# Patient Record
Sex: MALE | Race: WHITE | NOT HISPANIC OR LATINO | Employment: FULL TIME | URBAN - METROPOLITAN AREA
[De-identification: names, ages, dates, MRNs, and addresses within clinical notes are randomized per-mention and may not be internally consistent; named-entity substitution may affect disease eponyms.]

---

## 2019-04-24 ENCOUNTER — TELEPHONE (OUTPATIENT)
Dept: HEPATOLOGY | Facility: CLINIC | Age: 44
End: 2019-04-24

## 2019-04-24 NOTE — TELEPHONE ENCOUNTER
Dr. Jordan Braun ordered that patient be scheduled for hep c consult.  Attempt made to reach him to schedule appt with PA Scheuermann.  I LVM and sent letter asking that he call us for scheduling.

## 2019-05-10 ENCOUNTER — LAB VISIT (OUTPATIENT)
Dept: LAB | Facility: HOSPITAL | Age: 44
End: 2019-05-10
Payer: MEDICAID

## 2019-05-10 ENCOUNTER — PROCEDURE VISIT (OUTPATIENT)
Dept: HEPATOLOGY | Facility: CLINIC | Age: 44
End: 2019-05-10
Attending: PHYSICIAN ASSISTANT
Payer: MEDICAID

## 2019-05-10 ENCOUNTER — INITIAL CONSULT (OUTPATIENT)
Dept: HEPATOLOGY | Facility: CLINIC | Age: 44
End: 2019-05-10
Payer: MEDICAID

## 2019-05-10 VITALS
RESPIRATION RATE: 18 BRPM | HEART RATE: 81 BPM | TEMPERATURE: 98 F | HEIGHT: 66 IN | BODY MASS INDEX: 23.66 KG/M2 | SYSTOLIC BLOOD PRESSURE: 119 MMHG | DIASTOLIC BLOOD PRESSURE: 75 MMHG | WEIGHT: 147.25 LBS

## 2019-05-10 DIAGNOSIS — R74.8 ABNORMAL TRANSAMINASES: ICD-10-CM

## 2019-05-10 DIAGNOSIS — B18.2 CHRONIC HEPATITIS C WITHOUT HEPATIC COMA: ICD-10-CM

## 2019-05-10 DIAGNOSIS — R53.83 FATIGUE, UNSPECIFIED TYPE: ICD-10-CM

## 2019-05-10 DIAGNOSIS — B18.2 CHRONIC HEPATITIS C WITHOUT HEPATIC COMA: Primary | ICD-10-CM

## 2019-05-10 LAB
ALBUMIN SERPL BCP-MCNC: 4.3 G/DL (ref 3.5–5.2)
ALP SERPL-CCNC: 95 U/L (ref 55–135)
ALT SERPL W/O P-5'-P-CCNC: 55 U/L (ref 10–44)
ANION GAP SERPL CALC-SCNC: 8 MMOL/L (ref 8–16)
AST SERPL-CCNC: 26 U/L (ref 10–40)
BASOPHILS # BLD AUTO: 0.02 K/UL (ref 0–0.2)
BASOPHILS NFR BLD: 0.4 % (ref 0–1.9)
BILIRUB SERPL-MCNC: 1.9 MG/DL (ref 0.1–1)
BUN SERPL-MCNC: 19 MG/DL (ref 6–20)
CALCIUM SERPL-MCNC: 9.7 MG/DL (ref 8.7–10.5)
CHLORIDE SERPL-SCNC: 103 MMOL/L (ref 95–110)
CO2 SERPL-SCNC: 30 MMOL/L (ref 23–29)
CREAT SERPL-MCNC: 0.9 MG/DL (ref 0.5–1.4)
DIFFERENTIAL METHOD: ABNORMAL
EOSINOPHIL # BLD AUTO: 0.1 K/UL (ref 0–0.5)
EOSINOPHIL NFR BLD: 1.1 % (ref 0–8)
ERYTHROCYTE [DISTWIDTH] IN BLOOD BY AUTOMATED COUNT: 11.8 % (ref 11.5–14.5)
EST. GFR  (AFRICAN AMERICAN): >60 ML/MIN/1.73 M^2
EST. GFR  (NON AFRICAN AMERICAN): >60 ML/MIN/1.73 M^2
FERRITIN SERPL-MCNC: 159 NG/ML (ref 20–300)
GLUCOSE SERPL-MCNC: 73 MG/DL (ref 70–110)
HBV CORE AB SERPL QL IA: NEGATIVE
HBV SURFACE AB SER-ACNC: POSITIVE M[IU]/ML
HBV SURFACE AG SERPL QL IA: NEGATIVE
HCT VFR BLD AUTO: 42.3 % (ref 40–54)
HEPATITIS A ANTIBODY, IGG: POSITIVE
HGB BLD-MCNC: 14.6 G/DL (ref 14–18)
HIV 1+2 AB+HIV1 P24 AG SERPL QL IA: NEGATIVE
IGG SERPL-MCNC: 1273 MG/DL (ref 650–1600)
IMM GRANULOCYTES # BLD AUTO: 0.02 K/UL (ref 0–0.04)
IMM GRANULOCYTES NFR BLD AUTO: 0.4 % (ref 0–0.5)
INR PPP: 1 (ref 0.8–1.2)
IRON SERPL-MCNC: 98 UG/DL (ref 45–160)
LYMPHOCYTES # BLD AUTO: 2 K/UL (ref 1–4.8)
LYMPHOCYTES NFR BLD: 35.8 % (ref 18–48)
MCH RBC QN AUTO: 31.1 PG (ref 27–31)
MCHC RBC AUTO-ENTMCNC: 34.5 G/DL (ref 32–36)
MCV RBC AUTO: 90 FL (ref 82–98)
MONOCYTES # BLD AUTO: 0.5 K/UL (ref 0.3–1)
MONOCYTES NFR BLD: 9.5 % (ref 4–15)
NEUTROPHILS # BLD AUTO: 2.9 K/UL (ref 1.8–7.7)
NEUTROPHILS NFR BLD: 52.8 % (ref 38–73)
NRBC BLD-RTO: 0 /100 WBC
PLATELET # BLD AUTO: 223 K/UL (ref 150–350)
PMV BLD AUTO: 9.4 FL (ref 9.2–12.9)
POTASSIUM SERPL-SCNC: 3.6 MMOL/L (ref 3.5–5.1)
PROT SERPL-MCNC: 8 G/DL (ref 6–8.4)
PROTHROMBIN TIME: 10.2 SEC (ref 9–12.5)
RBC # BLD AUTO: 4.69 M/UL (ref 4.6–6.2)
SATURATED IRON: 23 % (ref 20–50)
SODIUM SERPL-SCNC: 141 MMOL/L (ref 136–145)
TOTAL IRON BINDING CAPACITY: 422 UG/DL (ref 250–450)
TRANSFERRIN SERPL-MCNC: 285 MG/DL (ref 200–375)
TSH SERPL DL<=0.005 MIU/L-ACNC: 0.43 UIU/ML (ref 0.4–4)
WBC # BLD AUTO: 5.56 K/UL (ref 3.9–12.7)

## 2019-05-10 PROCEDURE — 99214 OFFICE O/P EST MOD 30 MIN: CPT | Mod: PBBFAC,25 | Performed by: PHYSICIAN ASSISTANT

## 2019-05-10 PROCEDURE — 83540 ASSAY OF IRON: CPT

## 2019-05-10 PROCEDURE — 86704 HEP B CORE ANTIBODY TOTAL: CPT

## 2019-05-10 PROCEDURE — 99999 PR PBB SHADOW E&M-EST. PATIENT-LVL IV: ICD-10-PCS | Mod: PBBFAC,,, | Performed by: PHYSICIAN ASSISTANT

## 2019-05-10 PROCEDURE — 99999 PR PBB SHADOW E&M-EST. PATIENT-LVL IV: CPT | Mod: PBBFAC,,, | Performed by: PHYSICIAN ASSISTANT

## 2019-05-10 PROCEDURE — 87340 HEPATITIS B SURFACE AG IA: CPT

## 2019-05-10 PROCEDURE — 87522 HEPATITIS C REVRS TRNSCRPJ: CPT

## 2019-05-10 PROCEDURE — 86038 ANTINUCLEAR ANTIBODIES: CPT

## 2019-05-10 PROCEDURE — 91200 LIVER ELASTOGRAPHY: CPT | Mod: 26,S$PBB,, | Performed by: PHYSICIAN ASSISTANT

## 2019-05-10 PROCEDURE — 99204 PR OFFICE/OUTPT VISIT, NEW, LEVL IV, 45-59 MIN: ICD-10-PCS | Mod: S$PBB,,, | Performed by: PHYSICIAN ASSISTANT

## 2019-05-10 PROCEDURE — 91200 LIVER ELASTOGRAPHY: CPT | Mod: PBBFAC | Performed by: PHYSICIAN ASSISTANT

## 2019-05-10 PROCEDURE — 86703 HIV-1/HIV-2 1 RESULT ANTBDY: CPT

## 2019-05-10 PROCEDURE — 82728 ASSAY OF FERRITIN: CPT

## 2019-05-10 PROCEDURE — 86790 VIRUS ANTIBODY NOS: CPT

## 2019-05-10 PROCEDURE — 84443 ASSAY THYROID STIM HORMONE: CPT

## 2019-05-10 PROCEDURE — 91200 PR LIVER ELASTOGRAPHY W/OUT IMAG W/INTERP & REPORT: ICD-10-PCS | Mod: 26,S$PBB,, | Performed by: PHYSICIAN ASSISTANT

## 2019-05-10 PROCEDURE — 82784 ASSAY IGA/IGD/IGG/IGM EACH: CPT

## 2019-05-10 PROCEDURE — 86235 NUCLEAR ANTIGEN ANTIBODY: CPT

## 2019-05-10 PROCEDURE — 36415 COLL VENOUS BLD VENIPUNCTURE: CPT

## 2019-05-10 PROCEDURE — 86256 FLUORESCENT ANTIBODY TITER: CPT | Mod: 91

## 2019-05-10 PROCEDURE — 80053 COMPREHEN METABOLIC PANEL: CPT

## 2019-05-10 PROCEDURE — 82595 ASSAY OF CRYOGLOBULIN: CPT

## 2019-05-10 PROCEDURE — 86706 HEP B SURFACE ANTIBODY: CPT

## 2019-05-10 PROCEDURE — 85610 PROTHROMBIN TIME: CPT

## 2019-05-10 PROCEDURE — 99204 OFFICE O/P NEW MOD 45 MIN: CPT | Mod: S$PBB,,, | Performed by: PHYSICIAN ASSISTANT

## 2019-05-10 PROCEDURE — 85025 COMPLETE CBC W/AUTO DIFF WBC: CPT

## 2019-05-10 RX ORDER — VELPATASVIR AND SOFOSBUVIR 100; 400 MG/1; MG/1
1 TABLET, FILM COATED ORAL DAILY
Qty: 28 TABLET | Refills: 2 | Status: SHIPPED | OUTPATIENT
Start: 2019-05-10 | End: 2020-01-03 | Stop reason: ALTCHOICE

## 2019-05-10 NOTE — PROCEDURES
Procedures   Fibroscan Procedure     Name: Chet Finley  Date of Procedure : 05/10/2019   :: Jennifer B Scheuermann, PA  Diagnosis: HCV    Probe: M    Fibroscan readin.2 KPa    Fibrosis:F 0-1     CAP readin dB/m    Steatosis: :<S1

## 2019-05-10 NOTE — LETTER
May 10, 2019      Jordan Braun MD  [address] [facility] [address]  [place] [address]           Deshawn Meza - Hepatitis C  [address]  [facility] [address]  Phone: [phone]  Fax: [phone]          Patient: Chet Finley   MR Number: [id number]   YOB: 1975   Date of Visit: 5/10/2019       Dear Dr. Jordan Braun:    Thank you for referring Chet Finley to me for evaluation. Attached you will find relevant portions of my assessment and plan of care.    If you have questions, please do not hesitate to call me. I look forward to following Chet Finley along with you.    Sincerely,    Jennifer B. Scheuermann, PA    Enclosure  CC:  No Recipients    If you would like to receive this communication electronically, please contact [email or web address] or [phone] to request more information on [employer] Link access.    For providers and/or their staff who would like to refer a patient to [facility], please contact us through our one-stop-shop provider referral line, [facility], at [phone].    If you feel you have received this communication in error or would no longer like to receive these types of communications, please e-mail [email or web address]

## 2019-05-10 NOTE — PROGRESS NOTES
HEPATOLOGY CLINIC VISIT NOTE - HCV clinic    OUTSIDE REFERRING PROVIDER: Joradn Braun MD    CHIEF COMPLAINT: Hepatitis C   (here w/ ex-wife, now girlfriend)    HISTORY: This is a 44 y.o. white male with chronic hepatitis C, here for further eval / mngmt.   Outside records have been received / reviewed.      HCV history:  Originally diagnosed w/ HCV ~ 1.5 yrs ago after being evaluated for icteric illness and feeling poorly    Seen earlier this year by by Salud Ignacio NP (GI office in [place])  Appears HCV rx was prescribed but denied due to lack of fibrosis.  Pt received email yesterday w/ attached [employer] application for patient assistance / free drug.    Risks for HCV: Prior IV and intranasal drug use: first use age 24, last use 2 yrs ago    Tattoos: first one at age 15    No blood transfusions  - Treatment naive  - Genotype 1a  - NS5A resistance not known  - HCV RNA 65,407 IU/mL (1/21/19 - scanned in media)      Liver staging:  HCV FibroTest - A3, F0 (1/21/19 - scanned in Media)  No liver imaging avail but describes u/s w/in last week at Dr Braun's  Labs reveal well preserved liver function w/ no evidence of advanced fibrosis  1/21/19 outside labs (scannd in media)   Elevated transamianses ALT>AST   Albumin > 4   Normal tbili   CBC unremarkable w/ plt > 200       Pt reports not feeling well. Describes noted fatigue / malaise, intermittent joint aching - affecting his ability to work.  Recent back pain. No urinary symptoms  Denies current jaundice, dark urine, abdominal distention, hematemesis, melena, slowed mentation.   No abnormal skin rashes.                     Past Medical History:   Diagnosis Date    Chronic hepatitis C      No past surgical history on file.      FAMILY HISTORY: Negative for liver disease    SOCIAL HISTORY:    but together with his ex-wife again (she is here with him)  Currently works cutting grass  Previously worked off [employer] for 10-12 yrs, 2wks on and 2 weeks off  Social  "History     Tobacco Use   Smoking Status Not on file     Alcohol - none now. Previously drank beer heavily during his "off time" for 10-12 yrs while working [place].  Drugs - prior IVDA, first use age 24, last use 2 yrs ago      ROS:   No fever, chills, weight loss, (+) fatigue  No chest pain, palpitations, dyspnea, cough  No abdominal pain, change in bowel pattern, nausea, vomiting, GERD  No dysuria, hematuria   No skin rashes   No headaches  No lower extremity edema  No depression or anxiety      PHYSICAL EXAM:  Friendly Unknown male, in no acute distress; alert and oriented to person, place and time  VITALS: reviewed  HEENT: Sclerae anicteric.   NECK: Supple  CVS: Regular rate and rhythm. No murmurs  LUNGS: Normal respiratory effort. Clear bilaterally  ABDOMEN: Flat, soft, nontender. No organomegaly or masses. No ascites or hernias. Good bowel sounds.    SKIN: Warm and dry. No jaundice, No obvious rashes. (+) tattoos  EXTREMITIES: No lower extremity edema  NEURO/PSYCH: Normal gate. Memory intact. Thought and speech pattern appropriate. Behavior normal. No depression or anxiety noted.      RECENT LABS:  See above - outside labs 1/2019  No records of HIV, HBV screening avail to me    RECENT IMAGING:  none    ASSESSMENT  44 y.o. Unknown male with:  1. CHRONIC HEPATITIS C, GENOTYPE 1a - treatment naive  -- FibroTest 1/2019 - A3, F0  -- Elevated transaminases  -- Unknown Immunity to HAV & HBV    2. HISTORY OF DRUG USE  -- clean x 2 years      DISCUSSION / EDUCATION:  *Discussed w/ pt that he does not need to see both Salud Ignacio NP and myself for HCV. Pt states he would like to continue HCV care here.    The natural history of Hepatitis C, including potential progression to cirrhosis was reviewed. We discussed the increased progression of liver disease secondary to alcohol use; patient was advised to avoid alcohol completely.     Transmission of Hepatitis C was reviewed, including possible sexual transmission. " Sexual contacts should be screened.   Patient should avoid sharing personal products such as razors, toothbrushes, etc.     Recommend avoiding raw seafood.  Limit acetaminophen to 2000mg daily.      HCV RX  Discussed goal of HCV eradication to prevent progression of liver disease.  Discussed use of Epclusa daily x 12 weeks w/ potential side effects of fatigue and headache.     Reviewed limitations on acid suppressant medications due to DDI w/ Epclusa:  -- Antacids, H2 Receptor Antagonist, PPI - Pt not taking  Patient instructed to contact me if experiencing acid related symptoms so further recommendations can be made regarding acid suppression therapy.      Herbal / alternative therapies must be discontinued  Pt will notify me if new medications are prescribed due to risk of drug interactions  Discussed importance of medication adherence and risk of treatment failure / viral resistance if not adherent. Pt has verbalized understanding.      PLAN:  1. Labs today  Orders Placed This Encounter   Procedures    CBC auto differential    Comprehensive metabolic panel    Protime-INR    HIV 1/2 Ag/Ab (4th Gen)    Hepatitis C RNA, quantitative, PCR    Hepatitis B surface antigen    Hepatitis B surface antibody    Hepatitis B core antibody, total    Hepatitis A antibody, IgG    Cryoglobulin    Ferritin    Iron and TIBC    SAMANTHA Screen w/Reflex    Antimitochondrial antibody    Anti-smooth muscle antibody    IgG    TSH     2. FibroScan today  3. Obtain outside u/s  4. Will move forward w/ Epclusa x 12 weeks in July  (Medicaid HCV DAA Prior Auth form was reviewed w/ pt.)  5. Remain off drugs/alcohol.     ADDENDUM:  FIBROSCAN today 5/10/10 - kPa 5.2, F0-1,   Outside u/s 5/2/19 from Dr Braun's office received - liver unremarkable. Spleen 12.6cm

## 2019-05-13 ENCOUNTER — TELEPHONE (OUTPATIENT)
Dept: PHARMACY | Facility: CLINIC | Age: 44
End: 2019-05-13

## 2019-05-13 LAB
ANA SER QL IF: NORMAL
HCV RNA SERPL NAA+PROBE-LOG IU: 4.04 LOG (10) IU/ML
HCV RNA SERPL QL NAA+PROBE: DETECTED IU/ML
HCV RNA SPEC NAA+PROBE-ACNC: ABNORMAL IU/ML
MITOCHONDRIA AB TITR SER IF: NORMAL {TITER}

## 2019-05-13 NOTE — TELEPHONE ENCOUNTER
LVM to notify the patient we received the prescription for Epclusa, and it will require authorization from the insurance company. We will continue to follow up.

## 2019-05-14 LAB — SMOOTH MUSCLE AB TITR SER IF: ABNORMAL {TITER}

## 2019-05-20 ENCOUNTER — PATIENT MESSAGE (OUTPATIENT)
Dept: HEPATOLOGY | Facility: CLINIC | Age: 44
End: 2019-05-20

## 2019-05-20 ENCOUNTER — TELEPHONE (OUTPATIENT)
Dept: HEPATOLOGY | Facility: CLINIC | Age: 44
End: 2019-05-20

## 2019-05-20 LAB — CRYOGLOB SER QL: ABNORMAL

## 2019-05-23 ENCOUNTER — PATIENT MESSAGE (OUTPATIENT)
Dept: HEPATOLOGY | Facility: CLINIC | Age: 44
End: 2019-05-23

## 2019-06-04 ENCOUNTER — PATIENT MESSAGE (OUTPATIENT)
Dept: HEPATOLOGY | Facility: CLINIC | Age: 44
End: 2019-06-04

## 2019-07-03 ENCOUNTER — PATIENT MESSAGE (OUTPATIENT)
Dept: HEPATOLOGY | Facility: CLINIC | Age: 44
End: 2019-07-03

## 2019-07-15 ENCOUNTER — TELEPHONE (OUTPATIENT)
Dept: PHARMACY | Facility: CLINIC | Age: 44
End: 2019-07-15

## 2019-07-15 ENCOUNTER — PATIENT MESSAGE (OUTPATIENT)
Dept: HEPATOLOGY | Facility: CLINIC | Age: 44
End: 2019-07-15

## 2019-07-15 NOTE — TELEPHONE ENCOUNTER
Epclusa Initial:  Initial Epclusa consult completed on 7/15. Epclusa will be shipped on  to arrive on  at patient's home via [employer]. $0.00 from -.  Patient plans to start Epclusa on - Confirmed 2 patient identifies - name and . Therapy appropriate.      Counseling was reviewed:      1. Patient MUST take Epclusa at the same time every day with or without food.       2. Patient MUST avoid acid reducers without consulting myself or provider first. Antacids are to be spaced out at least 4 hours apart from Epclusa.      3. Potential side effects include: headaches and fatigue.  Headache: Patient may treat with OTC remedies. If Tylenol is used, dose should not exceed 2000mg per day.      4.Medication list reviewed. No DDI's or allergies noted. Patient MUST contact myself or provider prior to starting any new OTC, herbal, or prescription drugs to avoid potential DDIs.         Discussed the importance of staying well hydrated while on therapy. Compliance stressed, patient to take missed doses as soon as remember, but NOT to take 2 doses in one day. Patient will report questions or concerns to myself or practitioner.  Patient verbalizes understanding. Patient plans to start Epclusa on .  Consultation included: indication, goals of treatment, administration, storage and handling, side effects, how to handle missed doses, the importance of laboratory monitoring, the importance of keeping all follow up appointment. Patient understands to report any medication changes to [facility] and provider All questions answered and addressed to patient's satisfaction, Follow-up will occur 7 days from start of treatment, [facility] to contact patient in 3 weeks for refills. Provider notified of start date.

## 2019-07-15 NOTE — TELEPHONE ENCOUNTER
DOCUMENTATION ONLY:  Prior authorization for AG Epclusa does not required prior authorization with insurance company.     Co-pay: $0    Patient Assistance IS NOT required.     Forward to clinical pharmacist for consult & shipment.

## 2019-07-19 ENCOUNTER — TELEPHONE (OUTPATIENT)
Dept: HEPATOLOGY | Facility: CLINIC | Age: 44
End: 2019-07-19

## 2019-07-19 DIAGNOSIS — B18.2 CHRONIC HEPATITIS C WITHOUT HEPATIC COMA: Primary | ICD-10-CM

## 2019-07-19 NOTE — TELEPHONE ENCOUNTER
Pt beginning 12 weeks of Epclusa on 7/17  Kaity 1a, tx naive  F0-1        Pls schedule:  - CMP, HCV RNA at week 6 - 8/27  - CMP, HCV RNA - SVR12 - 12/31

## 2019-08-01 ENCOUNTER — PATIENT MESSAGE (OUTPATIENT)
Dept: HEPATOLOGY | Facility: CLINIC | Age: 44
End: 2019-08-01

## 2019-08-01 NOTE — TELEPHONE ENCOUNTER
Spoke to wife:  Several days ago pt had n/v x 1  Now pt w/ significant fatigue, cough, pain in shoulders    Discussed that symptoms are not likely from liver disease, HCV, or epclusa  Recommended pt f/u w/ PCP    Wife verbalized understanding.

## 2019-08-08 ENCOUNTER — TELEPHONE (OUTPATIENT)
Dept: PHARMACY | Facility: CLINIC | Age: 44
End: 2019-08-08

## 2019-08-08 NOTE — TELEPHONE ENCOUNTER
Epclusa (2 of 3)  refill confirmed. We will ship Epclusa refill on  via [employer]ex to arrive on . $0.00 copay- 004. Confirmed 2 patient identifiers - name and .     Patient has 7 doses of Epclusa remaining and takes it around 9pm nightly. Patient had some issues with fatigue, cough, and pain; but symptoms were unrelated to liver disease or Epclusa. Patient doing much better now and has no compliants No missed doses, no new medications, no new allergies or health conditions reported at this time. All questions answered and addressed to patients satisfaction. Pt verbalized understanding.

## 2019-08-28 NOTE — TELEPHONE ENCOUNTER
Refill and Follow-up:   Patient requested a call back on 8/29 because he is not at home. Will follow-up.

## 2019-09-04 ENCOUNTER — TELEPHONE (OUTPATIENT)
Dept: PHARMACY | Facility: CLINIC | Age: 44
End: 2019-09-04

## 2019-09-04 NOTE — TELEPHONE ENCOUNTER
Epclusa refill 3 of 3 attempted. Patient was not at home at time of call. Was given another number to reach him at [phone]. No answer. Left voicemail for call back.

## 2019-09-06 NOTE — TELEPHONE ENCOUNTER
Epclusa refill (3 of 3) confirmed with patient's wife, Thalia. We will ship Epclusa refill on  via fedex to arrive on 9/10. $0 copay- 004. Confirmed 2 patient identifiers - name and .     She reports that patient has ~6 doses of Epclusa remaining and takes it around 9:00 pm daily.  No side effects, missed doses, new medications, new allergies or health conditions reported at this time. All questions answered and addressed to satisfaction.

## 2019-09-27 ENCOUNTER — LAB VISIT (OUTPATIENT)
Dept: LAB | Facility: HOSPITAL | Age: 44
End: 2019-09-27
Attending: PHYSICIAN ASSISTANT
Payer: MEDICAID

## 2019-09-27 DIAGNOSIS — B18.2 CHRONIC HEPATITIS C WITHOUT HEPATIC COMA: ICD-10-CM

## 2019-09-27 LAB
ALBUMIN SERPL BCP-MCNC: 4.3 G/DL (ref 3.5–5.2)
ALP SERPL-CCNC: 85 U/L (ref 55–135)
ALT SERPL W/O P-5'-P-CCNC: 19 U/L (ref 10–44)
ANION GAP SERPL CALC-SCNC: 10 MMOL/L (ref 8–16)
AST SERPL-CCNC: 20 U/L (ref 10–40)
BILIRUB SERPL-MCNC: 2.4 MG/DL (ref 0.1–1)
BUN SERPL-MCNC: 17 MG/DL (ref 6–20)
CALCIUM SERPL-MCNC: 9 MG/DL (ref 8.7–10.5)
CHLORIDE SERPL-SCNC: 104 MMOL/L (ref 95–110)
CO2 SERPL-SCNC: 26 MMOL/L (ref 23–29)
CREAT SERPL-MCNC: 1.1 MG/DL (ref 0.5–1.4)
EST. GFR  (AFRICAN AMERICAN): >60 ML/MIN/1.73 M^2
EST. GFR  (NON AFRICAN AMERICAN): >60 ML/MIN/1.73 M^2
GLUCOSE SERPL-MCNC: 125 MG/DL (ref 70–110)
POTASSIUM SERPL-SCNC: 3.6 MMOL/L (ref 3.5–5.1)
PROT SERPL-MCNC: 7.8 G/DL (ref 6–8.4)
SODIUM SERPL-SCNC: 140 MMOL/L (ref 136–145)

## 2019-09-27 PROCEDURE — 87522 HEPATITIS C REVRS TRNSCRPJ: CPT

## 2019-09-27 PROCEDURE — 80053 COMPREHEN METABOLIC PANEL: CPT

## 2019-09-30 ENCOUNTER — TELEPHONE (OUTPATIENT)
Dept: HEPATOLOGY | Facility: CLINIC | Age: 44
End: 2019-09-30

## 2019-09-30 DIAGNOSIS — B18.2 CHRONIC HEPATITIS C WITHOUT HEPATIC COMA: Primary | ICD-10-CM

## 2019-09-30 LAB
HCV RNA SERPL NAA+PROBE-LOG IU: <1.08 LOG (10) IU/ML
HCV RNA SERPL QL NAA+PROBE: NOT DETECTED IU/ML
HCV RNA SPEC NAA+PROBE-ACNC: <12 IU/ML

## 2019-09-30 NOTE — TELEPHONE ENCOUNTER
HCV LAB REVIEW  Week 6 of Epclusa, planning on 12 weeks treatment  Kaity 1a, tx naive  F0-1    Pertinent labs:  9/27  CMP stable, transaminases normal. TBili 2.4 (baseline 1.9)  HCV neg    [facility] message sent to pt:  Labs look good. Liver enzymes now normal. HCV neg  - Continue Epclusa - 1 pill daily - don't miss any doses.      Next labs to see if pt is cured  - CMP, HCV RNA, DBili - SVR12 - 12/31

## 2019-10-03 ENCOUNTER — PATIENT MESSAGE (OUTPATIENT)
Dept: HEPATOLOGY | Facility: CLINIC | Age: 44
End: 2019-10-03

## 2019-12-31 ENCOUNTER — LAB VISIT (OUTPATIENT)
Dept: LAB | Facility: HOSPITAL | Age: 44
End: 2019-12-31
Attending: PHYSICIAN ASSISTANT
Payer: MEDICAID

## 2019-12-31 DIAGNOSIS — B18.2 CHRONIC HEPATITIS C WITHOUT HEPATIC COMA: ICD-10-CM

## 2019-12-31 LAB
ALBUMIN SERPL BCP-MCNC: 4.4 G/DL (ref 3.5–5.2)
ALP SERPL-CCNC: 85 U/L (ref 55–135)
ALT SERPL W/O P-5'-P-CCNC: 19 U/L (ref 10–44)
ANION GAP SERPL CALC-SCNC: 6 MMOL/L (ref 8–16)
AST SERPL-CCNC: 21 U/L (ref 10–40)
BILIRUB DIRECT SERPL-MCNC: 0.3 MG/DL (ref 0.1–0.3)
BILIRUB SERPL-MCNC: 1 MG/DL (ref 0.1–1)
BUN SERPL-MCNC: 17 MG/DL (ref 6–20)
CALCIUM SERPL-MCNC: 9.7 MG/DL (ref 8.7–10.5)
CHLORIDE SERPL-SCNC: 105 MMOL/L (ref 95–110)
CO2 SERPL-SCNC: 32 MMOL/L (ref 23–29)
CREAT SERPL-MCNC: 1.1 MG/DL (ref 0.5–1.4)
EST. GFR  (AFRICAN AMERICAN): >60 ML/MIN/1.73 M^2
EST. GFR  (NON AFRICAN AMERICAN): >60 ML/MIN/1.73 M^2
GLUCOSE SERPL-MCNC: 95 MG/DL (ref 70–110)
POTASSIUM SERPL-SCNC: 4 MMOL/L (ref 3.5–5.1)
PROT SERPL-MCNC: 8 G/DL (ref 6–8.4)
SODIUM SERPL-SCNC: 143 MMOL/L (ref 136–145)

## 2019-12-31 PROCEDURE — 80053 COMPREHEN METABOLIC PANEL: CPT

## 2019-12-31 PROCEDURE — 36415 COLL VENOUS BLD VENIPUNCTURE: CPT | Mod: PO

## 2019-12-31 PROCEDURE — 87522 HEPATITIS C REVRS TRNSCRPJ: CPT

## 2019-12-31 PROCEDURE — 82248 BILIRUBIN DIRECT: CPT

## 2020-01-03 ENCOUNTER — PATIENT MESSAGE (OUTPATIENT)
Dept: HEPATOLOGY | Facility: CLINIC | Age: 45
End: 2020-01-03

## 2020-01-03 ENCOUNTER — TELEPHONE (OUTPATIENT)
Dept: HEPATOLOGY | Facility: CLINIC | Age: 45
End: 2020-01-03

## 2020-01-03 DIAGNOSIS — Z86.19 HISTORY OF HEPATITIS C: ICD-10-CM

## 2020-01-03 NOTE — TELEPHONE ENCOUNTER
HCV LAB REVIEW  Completed 12 weeks of Epclusa, 10/2019  Kaity 1a, tx naive  F0-1    Pertinent labs:  12/31  CMP stable, liver panel normal  HCV neg    These labs document SVR12 following successful HCV treatment with Epclusa   This is consistent with a cure. Relapse is not expected.   No immunity is conferred and patient could be reinfected.     Pt has been notified by [facility]    Please schedule   - HCV RNA in 6 months

## 2020-06-30 ENCOUNTER — LAB VISIT (OUTPATIENT)
Dept: LAB | Facility: HOSPITAL | Age: 45
End: 2020-06-30
Attending: PHYSICIAN ASSISTANT
Payer: MEDICAID

## 2020-06-30 DIAGNOSIS — Z86.19 HISTORY OF HEPATITIS C: ICD-10-CM

## 2020-06-30 PROCEDURE — 36415 COLL VENOUS BLD VENIPUNCTURE: CPT | Mod: PO

## 2020-06-30 PROCEDURE — 87522 HEPATITIS C REVRS TRNSCRPJ: CPT

## 2021-05-10 ENCOUNTER — PATIENT MESSAGE (OUTPATIENT)
Dept: RESEARCH | Facility: HOSPITAL | Age: 46
End: 2021-05-10

## 2021-08-23 ENCOUNTER — TELEPHONE (OUTPATIENT)
Dept: ORTHOPEDICS | Facility: CLINIC | Age: 46
End: 2021-08-23

## 2021-08-23 DIAGNOSIS — M25.562 LEFT KNEE PAIN, UNSPECIFIED CHRONICITY: Primary | ICD-10-CM

## 2021-08-24 ENCOUNTER — OFFICE VISIT (OUTPATIENT)
Dept: ORTHOPEDICS | Facility: CLINIC | Age: 46
End: 2021-08-24
Payer: MEDICAID

## 2021-08-24 VITALS — BODY MASS INDEX: 26.5 KG/M2 | WEIGHT: 164.88 LBS | HEIGHT: 66 IN

## 2021-08-24 DIAGNOSIS — M23.52 CHRONIC INSTABILITY OF LEFT KNEE: ICD-10-CM

## 2021-08-24 DIAGNOSIS — M23.92 INTERNAL DERANGEMENT OF MULTIPLE SITES OF LEFT KNEE: Primary | ICD-10-CM

## 2021-08-24 PROCEDURE — 99203 PR OFFICE/OUTPT VISIT, NEW, LEVL III, 30-44 MIN: ICD-10-PCS | Mod: S$PBB,,, | Performed by: ORTHOPAEDIC SURGERY

## 2021-08-24 PROCEDURE — 99213 OFFICE O/P EST LOW 20 MIN: CPT | Mod: PBBFAC,PN | Performed by: ORTHOPAEDIC SURGERY

## 2021-08-24 PROCEDURE — 99203 OFFICE O/P NEW LOW 30 MIN: CPT | Mod: S$PBB,,, | Performed by: ORTHOPAEDIC SURGERY

## 2021-08-24 PROCEDURE — 99999 PR PBB SHADOW E&M-EST. PATIENT-LVL III: ICD-10-PCS | Mod: PBBFAC,,, | Performed by: ORTHOPAEDIC SURGERY

## 2021-08-24 PROCEDURE — 99999 PR PBB SHADOW E&M-EST. PATIENT-LVL III: CPT | Mod: PBBFAC,,, | Performed by: ORTHOPAEDIC SURGERY

## 2021-08-24 RX ORDER — INDOMETHACIN 50 MG/1
50 CAPSULE ORAL 2 TIMES DAILY WITH MEALS
Qty: 60 CAPSULE | Refills: 2 | Status: SHIPPED | OUTPATIENT
Start: 2021-08-24 | End: 2021-11-30

## 2021-09-22 ENCOUNTER — HOSPITAL ENCOUNTER (OUTPATIENT)
Dept: RADIOLOGY | Facility: HOSPITAL | Age: 46
Discharge: HOME | End: 2021-09-22
Attending: ORTHOPAEDIC SURGERY
Payer: MEDICAID

## 2021-09-22 DIAGNOSIS — M23.91 INTERNAL DERANGEMENT OF MULTIPLE SITES OF RIGHT KNEE: ICD-10-CM

## 2021-09-22 DIAGNOSIS — M23.51 CHRONIC INSTABILITY OF RIGHT KNEE: ICD-10-CM

## 2021-09-22 PROCEDURE — 73721 MRI JNT OF LWR EXTRE W/O DYE: CPT | Mod: TC,LT

## 2021-09-22 PROCEDURE — 73721 MRI JNT OF LWR EXTRE W/O DYE: CPT | Mod: 26,LT,, | Performed by: RADIOLOGY

## 2021-09-22 PROCEDURE — 73721 MRI KNEE WITHOUT CONTRAST LEFT: ICD-10-PCS | Mod: 26,LT,, | Performed by: RADIOLOGY

## 2021-09-23 ENCOUNTER — OFFICE VISIT (OUTPATIENT)
Dept: ORTHOPEDICS | Facility: CLINIC | Age: 46
End: 2021-09-23
Payer: MEDICAID

## 2021-09-23 VITALS — WEIGHT: 164.88 LBS | BODY MASS INDEX: 26.62 KG/M2

## 2021-09-23 DIAGNOSIS — M23.92 INTERNAL DERANGEMENT OF MULTIPLE SITES OF LEFT KNEE: Primary | ICD-10-CM

## 2021-09-23 PROCEDURE — 20610 DRAIN/INJ JOINT/BURSA W/O US: CPT | Mod: PBBFAC,PN | Performed by: ORTHOPAEDIC SURGERY

## 2021-09-23 PROCEDURE — 99999 PR PBB SHADOW E&M-EST. PATIENT-LVL II: CPT | Mod: PBBFAC,,, | Performed by: ORTHOPAEDIC SURGERY

## 2021-09-23 PROCEDURE — 99212 OFFICE O/P EST SF 10 MIN: CPT | Mod: PBBFAC,PN,25 | Performed by: ORTHOPAEDIC SURGERY

## 2021-09-23 PROCEDURE — 99214 PR OFFICE/OUTPT VISIT, EST, LEVL IV, 30-39 MIN: ICD-10-PCS | Mod: S$PBB,25,, | Performed by: ORTHOPAEDIC SURGERY

## 2021-09-23 PROCEDURE — 99999 PR PBB SHADOW E&M-EST. PATIENT-LVL II: ICD-10-PCS | Mod: PBBFAC,,, | Performed by: ORTHOPAEDIC SURGERY

## 2021-09-23 PROCEDURE — 20610 LARGE JOINT ASPIRATION/INJECTION: L KNEE: ICD-10-PCS | Mod: S$PBB,LT,, | Performed by: ORTHOPAEDIC SURGERY

## 2021-09-23 PROCEDURE — 99214 OFFICE O/P EST MOD 30 MIN: CPT | Mod: S$PBB,25,, | Performed by: ORTHOPAEDIC SURGERY

## 2021-09-23 RX ORDER — TRIAMCINOLONE ACETONIDE 40 MG/ML
40 INJECTION, SUSPENSION INTRA-ARTICULAR; INTRAMUSCULAR
Status: DISCONTINUED | OUTPATIENT
Start: 2021-09-23 | End: 2021-09-23 | Stop reason: HOSPADM

## 2021-09-23 RX ADMIN — TRIAMCINOLONE ACETONIDE 40 MG: 40 INJECTION, SUSPENSION INTRA-ARTICULAR; INTRAMUSCULAR at 09:09

## 2022-02-06 ENCOUNTER — OFFICE VISIT (OUTPATIENT)
Dept: FAMILY MEDICINE | Facility: CLINIC | Age: 47
End: 2022-02-06
Payer: MEDICAID

## 2022-02-06 DIAGNOSIS — R68.83 CHILLS: ICD-10-CM

## 2022-02-06 DIAGNOSIS — B97.89 VIRAL RESPIRATORY ILLNESS: Primary | ICD-10-CM

## 2022-02-06 DIAGNOSIS — J98.8 VIRAL RESPIRATORY ILLNESS: Primary | ICD-10-CM

## 2022-02-06 LAB
CTP QC/QA: YES
SARS-COV-2 RDRP RESP QL NAA+PROBE: NEGATIVE

## 2022-02-06 PROCEDURE — 99213 PR OFFICE/OUTPT VISIT, EST, LEVL III, 20-29 MIN: ICD-10-PCS | Mod: 95,,, | Performed by: NURSE PRACTITIONER

## 2022-02-06 PROCEDURE — U0003 INFECTIOUS AGENT DETECTION BY NUCLEIC ACID (DNA OR RNA); SEVERE ACUTE RESPIRATORY SYNDROME CORONAVIRUS 2 (SARS-COV-2) (CORONAVIRUS DISEASE [COVID-19]), AMPLIFIED PROBE TECHNIQUE, MAKING USE OF HIGH THROUGHPUT TECHNOLOGIES AS DESCRIBED BY CMS-2020-01-R: HCPCS | Performed by: NURSE PRACTITIONER

## 2022-02-06 PROCEDURE — 99213 OFFICE O/P EST LOW 20 MIN: CPT | Mod: 95,,, | Performed by: NURSE PRACTITIONER

## 2022-02-06 PROCEDURE — U0005 INFEC AGEN DETEC AMPLI PROBE: HCPCS | Performed by: NURSE PRACTITIONER

## 2022-02-06 NOTE — LETTER
February 6, 2022    Chet Finley  [address]  [facility] [address]         [address] [address]  Phone: [phone]  Fax: [phone] February 6, 2022     Patient: Chet Finley   YOB: 1975   Date of Visit: 2/6/2022       To Whom It May Concern:    It is my medical opinion that Chet Finley should remain out of work until at least Wednesday morning, 2/9/2022.  .    If you have any questions or concerns, please don't hesitate to call.    Sincerely,        Yessica Amor, NP

## 2022-02-06 NOTE — PROGRESS NOTES
Chet Finley is a 46 y.o. male patient of Primary Doctor No who presents to the clinic today for a Virtual Visit.    The patient location is: [place], [place]  The chief complaint leading to consultation is: fever, chills, sweats and congestion  Visit type: audio only  Total time spent with patient: 5 minutes.  Each patient to whom he or she provides medical services by telemedicine is:  (1) informed of the relationship between the physician and patient and the respective role of any other health care provider with respect to management of the patient; and (2) notified that he or she may decline to receive medical services by telemedicine and may withdraw from such care at any time.    11 minutes of total time spent on the encounter, which includes face to face time and non-face to face time preparing to see the patient (eg, review of tests), Obtaining and/or reviewing separately obtained history, Documenting clinical information in the electronic or other health record, Independently interpreting results (not separately reported) and communicating results to the patient/family/caregiver, or Care coordination (not separately reported).     HPI    Pt, who is not known to me, reports a new problem to me: new problem--chills, sweats, nasal congestion for 2 days.  .    No known exposure to illness.    These symptoms began 48 hrs ago and status is slightly better.     Symptoms are + acute.    Pt denies the following symptoms:  N/v/d, loss of taste or smell, change in body aches, rash    Aggravating factors include being active .    Relieving factors include Mucinex .    OTC Medications tried are Mucines.    Prescription medications taken for symptoms are none.    Pertinent medical history:  No covid immunizations.    Smoking status:  never    ROS    Constitutional:   ?  Fever (he did not check).    Head:   No headache  Ears:   No pain.  Eyes:  No sxs  Nose:   No sinus pain, + congestion, + runny nose, + post nasal  drip.  Throat:  No ST pain.    Heart:  No palpitations, chest pain.    Lungs:  No difficulty breathing, no coughing.    GI/:  No sxs    MS:  No new bone, joint or muscle problems.    Skin:  No rashes, itching.    Past Medical History:   Diagnosis Date    History of hepatitis C, s/p successful Rx w/ SVR12 (cure) - 12/2019        Current Outpatient Medications:     indomethacin (INDOCIN) 50 MG capsule, TAKE ONE CAPSULE BY MOUTH TWICE DAILY WITH MEALS AS DIRECTED., Disp: 60 capsule, Rfl: 2      PHYSICAL EXAM    There were no vitals filed for this visit.  Vital signs not taken per patient.  Patient's questionnaire (if available), medications & PMH all reviewed today.    Gen:  Alert, coop 46 y.o. male patient in no acute distress.  Psych:   Behavior and affect appropriate for the situation and setting.  HENT:   No sneezing during the visit.  Voice steady, no hoarseness noted.  Voice with nasal quality  Resp:   Respiratory effort symmetrical.  No retractions  No increased work of breathing  No audible wheezes  Talks in full sentences.  No coughing during the interaction today.  CV:   No guero oral cyanosis  MSK:  Head and upper extremity movements smooth and even.  Neuro:  Responds promptly to questions showing good insight.  Skin:   No observed rashes/bruises    Viral respiratory illness  -     POCT COVID-19 Rapid Screening    Chills  -     COVID-19 Routine Screening; Future; Expected date: 02/06/2022          Rapid Covid neg.  Awaiting Covid PCR test result.    Pt today presents with 2 days of chills, sweats, nasal congestion and feeling ill. .  Additionally, he has not had any vaccinations for Covid or flu.  He did not take any medication for his symptoms except Mucinex, which he thinks helped some.    This is a new problem to me and the following work up is planned.    Pt advised to perform comfort measures recommended on patient instruction sheet, which were reviewed at the time of the visit..    If worse or  concerns, the patient is advised to call for advise to this office or the PCP office or call [facility] or go to the nearest URGENT CARE or ER.  Explained exam findings, diagnosis and treatment plan to patient.  Questions answered and patient states understanding.

## 2022-02-06 NOTE — PATIENT INSTRUCTIONS
Your symptoms today are consistent with a viral illness..  This is likely needs to run its course.  Antibiotics will likely not help.    Comfort measures:  Increase fluids  Get plenty of rest  Vaporizer or frequent showers may be helpful.  Take tylenol of ibuprofen as needed for pain or fever  For thick mucus secretions, you can take over-the-counter guaifenesin (Mucinex, plain Robitussin).  Drink plenty of water while taking this to help loosen mucus.    Salt water gargles for throat discomfort.  Saline nasal spray to help clear the nose.  Wash cloth with warm water placed over the sinus area can lessen discomfort and pressure.  Sleep with head of bed elevated to decrease drainage, cough and congestion.    I recommend frequent handwashing with soap under running water for 20 seconds to limit spread of infection to others     If worse or you have concerns or questions, please do not hesitate to call.  You can reach us at [phone] Monday through Friday 7 a.m. to 6:30 p.m.  Saturday 9 a.m. to 4:30 p.m. Sunday 9 a.m. to 2:30 p.m. (except holidays).     If you are worse at any time, go for an in-person evaluation to your PCP office, an URGENT CARE or the Emergency Department.    You have been evaluated today via a telehealth visit.  While providing good convenience and increased access to care, it is not always possible to fully evaluate a medical problem through type of patient care encounter.  If your symptoms change or worsen or the treatment is not effective, please follow-up with an in-person visit to your primary care center, at an urgent care or the emergency room.  Thank you for choosing [facility]!    Evenings and weekends [facility] is also available if symptoms worsen or fail to improve.  When you call the number, a registered nurse answers and takes your information.  The nurse can look at your medical record and make recommendations or call the on call physician, if warranted.     There are many  urgent cares available for [facility] patients.  Use this link to find the locations nearest you:    [email or web address]    [facility] include:    Urgent Cares associated with [facility] are open M-F evenings and on the weekends, as well.    [facility] Address: [address] [place], [place] [address] Phone: [phone]    [facility] Address:  [address] [place] [address] [place] [address] Phone: [phone]    [facility] Address:  [address] [place] [address] Phone: [phone]    Thank you for using the [facility]!    SIMONA Tao, CNP, FNP-BC  [facility]

## 2022-02-08 LAB
SARS-COV-2 RNA RESP QL NAA+PROBE: NOT DETECTED
SARS-COV-2- CYCLE NUMBER: NORMAL

## 2023-05-24 PROBLEM — K35.80 ACUTE APPENDICITIS: Status: RESOLVED | Noted: 2023-05-24 | Resolved: 2023-05-24

## 2023-05-24 PROBLEM — K35.80 ACUTE APPENDICITIS: Status: ACTIVE | Noted: 2023-05-24
